# Patient Record
Sex: MALE | Employment: FULL TIME
[De-identification: names, ages, dates, MRNs, and addresses within clinical notes are randomized per-mention and may not be internally consistent; named-entity substitution may affect disease eponyms.]

---

## 2023-08-31 ENCOUNTER — NURSE TRIAGE (OUTPATIENT)
Dept: OTHER | Facility: CLINIC | Age: 33
End: 2023-08-31

## 2023-08-31 NOTE — TELEPHONE ENCOUNTER
Location of patient: [place]    Received call from Summer at [facility]; Patient with Red Flag Complaint requesting to establish care with [facility]. Subjective: Caller states \"not able to walk straight, exhausted, sore throat, vomiting\"     Current Symptoms: No dizziness, feels off balance  Feels weak, no energy  No vomiting since last night. This all started last night after smelling something bad. He can get up and walk but will be slow  Has been drinking pedialyte  EMS was called last night, they gave him Zofran, vitals were fine, BS was fine. Onset: last night     Associated Symptoms: NA    Pain Severity:     Temperature: denies     What has been tried: zofran    LMP: NA Pregnant: NA    Recommended disposition: Go to [facility]    Care advice provided, patient verbalizes understanding; denies any other questions or concerns; instructed to call back for any new or worsening symptoms. Patient/Caller agrees with recommended disposition; writer provided warm transfer to [address] [place] [address] at [facility] for appointment scheduling    Attention Provider: Thank you for allowing me to participate in the care of your patient. The patient was connected to triage in response to information provided to the [facility]. Please do not respond through this encounter as the response is not directed to a shared pool.     Reason for Disposition   MODERATE weakness (i.e., interferes with work, school, normal activities) and cause unknown (Exceptions: Weakness from acute minor illness or from poor fluid intake; weakness is chronic and not worse.)    Protocols used: Weakness (Generalized) and Fatigue-ADULT-OH